# Patient Record
Sex: MALE | Race: WHITE | URBAN - METROPOLITAN AREA
[De-identification: names, ages, dates, MRNs, and addresses within clinical notes are randomized per-mention and may not be internally consistent; named-entity substitution may affect disease eponyms.]

---

## 2017-07-10 PROBLEM — Z87.448 HISTORY OF RENAL COLIC: Status: ACTIVE | Noted: 2017-07-10

## 2017-07-10 PROCEDURE — 81003 URINALYSIS AUTO W/O SCOPE: CPT | Performed by: INTERNAL MEDICINE

## 2019-07-30 PROBLEM — I49.5 TACHY-BRADY SYNDROME (HCC): Status: ACTIVE | Noted: 2019-07-30

## 2019-07-30 PROBLEM — I48.91 NEW ONSET A-FIB (HCC): Status: ACTIVE | Noted: 2019-07-30

## 2019-07-30 PROBLEM — Z09 HOSPITAL DISCHARGE FOLLOW-UP: Status: ACTIVE | Noted: 2019-07-30

## 2020-12-08 PROBLEM — N20.0 CALCULUS OF KIDNEY: Status: ACTIVE | Noted: 2020-12-08

## 2020-12-08 PROBLEM — R06.02 SHORTNESS OF BREATH: Status: ACTIVE | Noted: 2019-07-24

## 2020-12-08 PROBLEM — R00.2 PALPITATIONS: Status: ACTIVE | Noted: 2019-07-24

## 2020-12-11 PROBLEM — M19.011 OSTEOARTHRITIS OF RIGHT ACROMIOCLAVICULAR JOINT: Status: ACTIVE | Noted: 2020-12-11

## 2020-12-11 PROBLEM — M67.911 TENDINOPATHY OF RIGHT ROTATOR CUFF: Status: ACTIVE | Noted: 2020-12-11

## 2020-12-11 PROBLEM — G56.01 RIGHT CARPAL TUNNEL SYNDROME: Status: ACTIVE | Noted: 2020-12-11

## 2021-02-26 PROBLEM — M54.12 RIGHT CERVICAL RADICULOPATHY: Status: ACTIVE | Noted: 2021-02-26

## 2021-11-15 PROBLEM — S43.431A LABRAL TEAR OF SHOULDER, RIGHT, INITIAL ENCOUNTER: Status: ACTIVE | Noted: 2021-11-15

## 2023-05-01 RX ORDER — OLMESARTAN MEDOXOMIL 20 MG/1
20 TABLET ORAL DAILY
COMMUNITY
Start: 2023-02-12

## 2023-05-12 RX ORDER — HYDROCHLOROTHIAZIDE 12.5 MG/1
12.5 TABLET ORAL DAILY
COMMUNITY

## 2023-05-16 ENCOUNTER — HOSPITAL ENCOUNTER (OUTPATIENT)
Facility: HOSPITAL | Age: 63
Setting detail: HOSPITAL OUTPATIENT SURGERY
Discharge: HOME | End: 2023-05-16
Attending: SURGERY | Admitting: SURGERY
Payer: COMMERCIAL

## 2023-05-16 ENCOUNTER — ANESTHESIA (OUTPATIENT)
Dept: SURGERY | Facility: HOSPITAL | Age: 63
End: 2023-05-16
Payer: COMMERCIAL

## 2023-05-16 ENCOUNTER — ANESTHESIA EVENT (OUTPATIENT)
Dept: SURGERY | Facility: HOSPITAL | Age: 63
End: 2023-05-16
Payer: COMMERCIAL

## 2023-05-16 VITALS
TEMPERATURE: 97 F | DIASTOLIC BLOOD PRESSURE: 83 MMHG | WEIGHT: 176 LBS | OXYGEN SATURATION: 97 % | BODY MASS INDEX: 25.2 KG/M2 | SYSTOLIC BLOOD PRESSURE: 146 MMHG | RESPIRATION RATE: 18 BRPM | HEART RATE: 61 BPM | HEIGHT: 70 IN

## 2023-05-16 DIAGNOSIS — E04.1 NONTOXIC UNINODULAR GOITER: ICD-10-CM

## 2023-05-16 PROCEDURE — 88307 TISSUE EXAM BY PATHOLOGIST: CPT | Performed by: SURGERY

## 2023-05-16 PROCEDURE — 88331 PATH CONSLTJ SURG 1 BLK 1SPC: CPT | Performed by: SURGERY

## 2023-05-16 PROCEDURE — 88333 PATH CONSLTJ SURG CYTO XM 1: CPT | Performed by: SURGERY

## 2023-05-16 PROCEDURE — 0GTG0ZZ RESECTION OF LEFT THYROID GLAND LOBE, OPEN APPROACH: ICD-10-PCS | Performed by: SURGERY

## 2023-05-16 RX ORDER — ONDANSETRON 2 MG/ML
4 INJECTION INTRAMUSCULAR; INTRAVENOUS EVERY 6 HOURS PRN
Status: DISCONTINUED | OUTPATIENT
Start: 2023-05-16 | End: 2023-05-16

## 2023-05-16 RX ORDER — HYDROMORPHONE HYDROCHLORIDE 1 MG/ML
0.4 INJECTION, SOLUTION INTRAMUSCULAR; INTRAVENOUS; SUBCUTANEOUS EVERY 5 MIN PRN
Status: DISCONTINUED | OUTPATIENT
Start: 2023-05-16 | End: 2023-05-16

## 2023-05-16 RX ORDER — SODIUM CHLORIDE, SODIUM LACTATE, POTASSIUM CHLORIDE, CALCIUM CHLORIDE 600; 310; 30; 20 MG/100ML; MG/100ML; MG/100ML; MG/100ML
INJECTION, SOLUTION INTRAVENOUS CONTINUOUS
Status: DISCONTINUED | OUTPATIENT
Start: 2023-05-16 | End: 2023-05-16

## 2023-05-16 RX ORDER — HYDROMORPHONE HYDROCHLORIDE 1 MG/ML
0.6 INJECTION, SOLUTION INTRAMUSCULAR; INTRAVENOUS; SUBCUTANEOUS EVERY 5 MIN PRN
Status: DISCONTINUED | OUTPATIENT
Start: 2023-05-16 | End: 2023-05-16

## 2023-05-16 RX ORDER — HYDROCODONE BITARTRATE AND ACETAMINOPHEN 5; 325 MG/1; MG/1
2 TABLET ORAL ONCE AS NEEDED
Status: DISCONTINUED | OUTPATIENT
Start: 2023-05-16 | End: 2023-05-16

## 2023-05-16 RX ORDER — TRAMADOL HYDROCHLORIDE 50 MG/1
50 TABLET ORAL EVERY 8 HOURS PRN
Qty: 20 TABLET | Refills: 0 | Status: SHIPPED | OUTPATIENT
Start: 2023-05-16

## 2023-05-16 RX ORDER — PROCHLORPERAZINE EDISYLATE 5 MG/ML
5 INJECTION INTRAMUSCULAR; INTRAVENOUS EVERY 8 HOURS PRN
Status: DISCONTINUED | OUTPATIENT
Start: 2023-05-16 | End: 2023-05-16

## 2023-05-16 RX ORDER — DEXAMETHASONE SODIUM PHOSPHATE 4 MG/ML
VIAL (ML) INJECTION AS NEEDED
Status: DISCONTINUED | OUTPATIENT
Start: 2023-05-16 | End: 2023-05-16 | Stop reason: SURG

## 2023-05-16 RX ORDER — NALOXONE HYDROCHLORIDE 0.4 MG/ML
80 INJECTION, SOLUTION INTRAMUSCULAR; INTRAVENOUS; SUBCUTANEOUS AS NEEDED
Status: DISCONTINUED | OUTPATIENT
Start: 2023-05-16 | End: 2023-05-16

## 2023-05-16 RX ORDER — HYDROMORPHONE HYDROCHLORIDE 1 MG/ML
0.2 INJECTION, SOLUTION INTRAMUSCULAR; INTRAVENOUS; SUBCUTANEOUS EVERY 5 MIN PRN
Status: DISCONTINUED | OUTPATIENT
Start: 2023-05-16 | End: 2023-05-16

## 2023-05-16 RX ORDER — BUPIVACAINE HYDROCHLORIDE 5 MG/ML
INJECTION, SOLUTION EPIDURAL; INTRACAUDAL AS NEEDED
Status: DISCONTINUED | OUTPATIENT
Start: 2023-05-16 | End: 2023-05-16 | Stop reason: HOSPADM

## 2023-05-16 RX ORDER — SCOLOPAMINE TRANSDERMAL SYSTEM 1 MG/1
1 PATCH, EXTENDED RELEASE TRANSDERMAL ONCE
Status: DISCONTINUED | OUTPATIENT
Start: 2023-05-16 | End: 2023-05-16 | Stop reason: HOSPADM

## 2023-05-16 RX ORDER — CELECOXIB 200 MG/1
200 CAPSULE ORAL DAILY
COMMUNITY
Start: 2023-04-06

## 2023-05-16 RX ORDER — HYDROCODONE BITARTRATE AND ACETAMINOPHEN 5; 325 MG/1; MG/1
1 TABLET ORAL ONCE AS NEEDED
Status: DISCONTINUED | OUTPATIENT
Start: 2023-05-16 | End: 2023-05-16

## 2023-05-16 RX ORDER — ACETAMINOPHEN 500 MG
1000 TABLET ORAL ONCE
Status: DISCONTINUED | OUTPATIENT
Start: 2023-05-16 | End: 2023-05-16 | Stop reason: HOSPADM

## 2023-05-16 RX ORDER — ACETAMINOPHEN 500 MG
1000 TABLET ORAL ONCE AS NEEDED
Status: DISCONTINUED | OUTPATIENT
Start: 2023-05-16 | End: 2023-05-16

## 2023-05-16 RX ADMIN — DEXAMETHASONE SODIUM PHOSPHATE 4 MG: 4 MG/ML VIAL (ML) INJECTION at 14:25:00

## 2023-05-16 RX ADMIN — SODIUM CHLORIDE, SODIUM LACTATE, POTASSIUM CHLORIDE, CALCIUM CHLORIDE: 600; 310; 30; 20 INJECTION, SOLUTION INTRAVENOUS at 15:22:00

## 2023-05-16 NOTE — BRIEF OP NOTE
Pre-Operative Diagnosis: Nontoxic uninodular goiter [241. 0. ICD-9-CM]     Post-Operative Diagnosis: Nontoxic uninodular goiter [241. 0. ICD-9-CM]      Procedure Performed:   LEFT THYROID LOBECTOMY WITH INTRAOPERATIVE FROZEN SECTION, NERVE MONITORING    Surgeon(s) and Role:     Gerome Lombard, MD - Primary    Assistant(s):  Surgical Assistant.: NAYELI Osei     Surgical Findings: follicular lesion     Specimen: left thyroid gland     Estimated Blood Loss: Blood Output: 5 mL (5/16/2023  2:54 PM)    Dictation Number:  Leonid Martinez MD  5/16/2023  3:00 PM

## 2023-05-16 NOTE — ANESTHESIA PROCEDURE NOTES
Airway  Date/Time: 5/16/2023 2:21 PM  Urgency: elective    Airway not difficult    General Information and Staff    Patient location during procedure: OR  Anesthesiologist: Ashvin Ruth DO  Performed: anesthesiologist   Performed by: Ashvin Ruth DO  Authorized by: Ashvin Ruth DO      Indications and Patient Condition  Indications for airway management: anesthesia  Sedation level: deep  Preoxygenated: yes  Patient position: sniffing  Mask difficulty assessment: 1 - vent by mask    Final Airway Details  Final airway type: endotracheal airway      Successful airway: NIM tube  Cuffed: yes   Successful intubation technique: direct laryngoscopy  Endotracheal tube insertion site: oral  Blade: Kaylee  Blade size: #3  NIM tube Size: 7.0  Cormack-Lehane Classification: grade IIA - partial view of glottis  Placement verified by: chest auscultation and capnometry   Measured from: lips  ETT to lips (cm): 22  Number of attempts at approach: 1  Number of other approaches attempted: 0

## 2023-05-16 NOTE — DISCHARGE INSTRUCTIONS
May remove the gauze tomorrow morning  Leave the sterri strips intact  May shower  Diet and activity as tolerated  No lifting more than 5 pounds  No voice straining for 2 weeks  Ok to take ibuprofen 1 tablet every 8 hours as needed for pain  Ice pack around the incision site for the next 4 to 5 days  Elevate the head of the bed with 2 pillows for 5 days to reduce swelling

## 2023-05-17 NOTE — OPERATIVE REPORT
[facility]    PATIENT'S NAME: Fabrizio Farley   ATTENDING PHYSICIAN: Ju Mays M.D. OPERATING PHYSICIAN: Ju Mays M.D. PATIENT ACCOUNT#:   [de-identified]    LOCATION:  [facility] 11 EDWP 10  MEDICAL RECORD #:   [id number]       YOB: 1960  ADMISSION DATE:       05/16/2023      OPERATION DATE:  05/16/2023    OPERATIVE REPORT    PREOPERATIVE DIAGNOSIS:  Left thyroid goiter. POSTOPERATIVE DIAGNOSIS:  Left thyroid goiter. PROCEDURE:  Left thyroid lobectomy with intraoperative frozen section and neuromonitoring. ASSISTANT:  NAYELI Perez    ANESTHESIA:  General.    ESTIMATED BLOOD LOSS:  5 mL. SPECIMEN:  Left thyroid gland. DISPOSITION:  To PACU. COMPLICATIONS:  None. INDICATIONS:  Patient is a 52-year-old male who presented to the office after noticing a nodule on physical exam by his PCP. He had an outpatient ultrasound that confirmed the nodule and had a biopsy that was indeterminate and then also had the Afirma testing that showed 50% risk of malignancy, so he presented for surgical evaluation. Ultrasound images were reviewed and based on the results, discussed with the patient undergoing left thyroid lobectomy with frozen section, and if there is evidence of a papillary carcinoma, we would proceed with a total thyroidectomy. The risks and benefits of surgery were discussed and patient agreed for the procedure, signed the informed consent. FINDINGS:  Follicular lesion on frozen section. OPERATIVE TECHNIQUE:  Patient was brought to operating room, was placed in supine position on the operating table. Lower extremity SCD boots were placed. After IV sedation and neuromonitoring, endotracheal tube placement, both arms tucked at side, a roll was placed under shoulder blade, neck was slightly hyperextended. Then, the area was prepped into a sterile field. Lower Kocher neck incision was made with a 15-blade knife.   Electric Bovie cautery was used to separate the subcutaneous tissue, and the superior and inferior flaps raised. Deep cervical fascia was identified, midline incision was made, and then the strap muscles were carefully dissected from the thyroid capsule and then the superior and inferior poles divided as well as middle thyroid vein using the Harmonic scalpel. As the thyroid gland was medially rotated, both parathyroid glands were identified and preserved, and the nerve and artery identified. The artery was divided, and the nerve had a good signal on the monitor, then released from the ligament of Berry and pretracheal fascia, transected right at the isthmus. Removed gland was sent down to Pathology for evaluation. In the meantime, all the blood vessels that were divided using the Harmonic scalpel were reinforced using 3-0 silk ties. After obtaining good hemostasis, a piece of Surgicel laid, and then the results of the frozen section came back as a follicular lesion without any obvious evidence of a papillary carcinoma. At this time, 3-0 Vicryl was used to close the deep cervical and the dermal layer. Local anesthetic was injected into the surrounding tissue, and a 4-0 Monocryl for the skin placing a subcuticular stitch. Incision was then covered with Steri-Strips, 4 x 4, and tape. The patient tolerated the procedure well, was extubated in the operating room, transferred to PACU in stable condition. At the end of the case, needle, instrument, and sponge counts were all correct. The surgical assistant was medically necessary for the entire procedure to position the patient, prep the area, drape, retract the wound using instrument to remove the gland, and assist in closing and transferring patient out of the operating room.     Dictated By Meño Cordova M.D.  d: 05/16/2023 15:18:54  t: 05/17/2023 01:41:44  Job [id number]  FW/

## (undated) DEVICE — MEGADYNE ELECTRODE ADULT PT RT

## (undated) DEVICE — THYROID: Brand: MEDLINE INDUSTRIES, INC.

## (undated) DEVICE — SUT VICRYL 3-0 SH J416H

## (undated) DEVICE — SUT SILK 3-0 A304H

## (undated) DEVICE — PROBE 8225101 5PK STD PRASS FL TIP ROHS

## (undated) DEVICE — AIRWAY ENDO  TRIVANTAGE 7MM

## (undated) DEVICE — STERILE SYNTHETIC POLYISOPRENE POWDER-FREE SURGICAL GLOVES WITH HYDROGEL COATING: Brand: PROTEXIS

## (undated) DEVICE — ABSORBABLE HEMOSTAT (OXIDIZED REGENERATED CELLULOSE, U.S.P.): Brand: SURGICEL

## (undated) DEVICE — HARMONIC 1100 SHEARS, 20CM SHAFT LENGTH: Brand: HARMONIC

## (undated) DEVICE — 3M™ STERI-STRIP™ REINFORCED ADHESIVE SKIN CLOSURES, R1547, 1/2 IN X 4 IN (12 MM X 100 MM), 6 STRIPS/ENVELOPE: Brand: 3M™ STERI-STRIP™

## (undated) DEVICE — SLEEVE KENDALL SCD EXPRESS MED

## (undated) DEVICE — SUT MONOCRYL 4-0 PS-2 Y496G

## (undated) DEVICE — SOL NACL IRRIG 0.9% 1000ML BTL

## (undated) DEVICE — SUT SILK 2-0 A305H

## (undated) NOTE — LETTER
OUTSIDE TESTING RESULT REQUEST     IMPORTANT: FOR YOUR IMMEDIATE ATTENTION  Please FAX all test results listed below to: [phone]       * * * * If testing is NOT complete, arrange with patient A.S.A.P. * * * *      Patient Name: Jacob Funes  Surgery Date: 2023  Medical Record: [id number]  CSN: [id number]  : 1960 - A: 65 y     Sex: male  Surgeon(s):  Tru Denise MD  Procedure: LEFT THYROID LOBECTOMY WITH INTROPERATIVE FROZEN SECTION, NERVE MONITORING, POSSIBLE TOTAL THYROIDECTOMY  Anesthesia Type: General     Surgeon: Tru Denise MD     The following Testing and Time Line are REQUIRED PER ANESTHESIA     EKG READ AND SIGNED WITHIN   90 days      Thank You,   Sent by:MEGHAN Newton - Pre-Admission Testing